# Patient Record
Sex: FEMALE | Race: BLACK OR AFRICAN AMERICAN | NOT HISPANIC OR LATINO | Employment: UNEMPLOYED | URBAN - METROPOLITAN AREA
[De-identification: names, ages, dates, MRNs, and addresses within clinical notes are randomized per-mention and may not be internally consistent; named-entity substitution may affect disease eponyms.]

---

## 2024-03-09 ENCOUNTER — APPOINTMENT (OUTPATIENT)
Dept: ULTRASOUND IMAGING | Facility: CLINIC | Age: 39
End: 2024-03-09
Payer: COMMERCIAL

## 2024-03-09 ENCOUNTER — HOSPITAL ENCOUNTER (EMERGENCY)
Facility: CLINIC | Age: 39
Discharge: HOME | End: 2024-03-09
Attending: EMERGENCY MEDICINE | Admitting: EMERGENCY MEDICINE
Payer: COMMERCIAL

## 2024-03-09 VITALS
DIASTOLIC BLOOD PRESSURE: 60 MMHG | WEIGHT: 200 LBS | OXYGEN SATURATION: 100 % | TEMPERATURE: 98.3 F | HEIGHT: 69 IN | SYSTOLIC BLOOD PRESSURE: 102 MMHG | HEART RATE: 72 BPM | BODY MASS INDEX: 29.62 KG/M2 | RESPIRATION RATE: 20 BRPM

## 2024-03-09 DIAGNOSIS — R10.9 ABDOMINAL DISCOMFORT: ICD-10-CM

## 2024-03-09 DIAGNOSIS — B96.89 BACTERIAL VAGINOSIS: Primary | ICD-10-CM

## 2024-03-09 DIAGNOSIS — Z34.91 FIRST TRIMESTER PREGNANCY: ICD-10-CM

## 2024-03-09 DIAGNOSIS — N76.0 BACTERIAL VAGINOSIS: Primary | ICD-10-CM

## 2024-03-09 LAB
ALBUMIN SERPL BCG-MCNC: 3.8 G/DL (ref 3.5–5.2)
ALBUMIN UR-MCNC: 20 MG/DL
ALP SERPL-CCNC: 59 U/L (ref 40–150)
ALT SERPL W P-5'-P-CCNC: <5 U/L (ref 0–50)
ANION GAP SERPL CALCULATED.3IONS-SCNC: 11 MMOL/L (ref 7–15)
APPEARANCE UR: ABNORMAL
AST SERPL W P-5'-P-CCNC: 12 U/L (ref 0–45)
BACTERIA #/AREA URNS HPF: ABNORMAL /HPF
BASOPHILS # BLD AUTO: 0 10E3/UL (ref 0–0.2)
BASOPHILS NFR BLD AUTO: 0 %
BILIRUB DIRECT SERPL-MCNC: <0.2 MG/DL (ref 0–0.3)
BILIRUB SERPL-MCNC: 0.3 MG/DL
BILIRUB UR QL STRIP: NEGATIVE
BUN SERPL-MCNC: 6.5 MG/DL (ref 6–20)
CALCIUM SERPL-MCNC: 8.6 MG/DL (ref 8.6–10)
CHLORIDE SERPL-SCNC: 104 MMOL/L (ref 98–107)
CLUE CELLS: PRESENT
COLOR UR AUTO: YELLOW
CREAT SERPL-MCNC: 0.65 MG/DL (ref 0.51–0.95)
DEPRECATED HCO3 PLAS-SCNC: 22 MMOL/L (ref 22–29)
EGFRCR SERPLBLD CKD-EPI 2021: >90 ML/MIN/1.73M2
EOSINOPHIL # BLD AUTO: 0.1 10E3/UL (ref 0–0.7)
EOSINOPHIL NFR BLD AUTO: 2 %
ERYTHROCYTE [DISTWIDTH] IN BLOOD BY AUTOMATED COUNT: 12.8 % (ref 10–15)
GLUCOSE SERPL-MCNC: 102 MG/DL (ref 70–99)
GLUCOSE UR STRIP-MCNC: NEGATIVE MG/DL
HCG INTACT+B SERPL-ACNC: ABNORMAL MIU/ML
HCT VFR BLD AUTO: 35.6 % (ref 35–47)
HGB BLD-MCNC: 12.5 G/DL (ref 11.7–15.7)
HGB UR QL STRIP: ABNORMAL
IMM GRANULOCYTES # BLD: 0 10E3/UL
IMM GRANULOCYTES NFR BLD: 0 %
KETONES UR STRIP-MCNC: ABNORMAL MG/DL
LEUKOCYTE ESTERASE UR QL STRIP: ABNORMAL
LIPASE SERPL-CCNC: 33 U/L (ref 13–60)
LYMPHOCYTES # BLD AUTO: 1.1 10E3/UL (ref 0–5.3)
LYMPHOCYTES NFR BLD AUTO: 25 %
MAGNESIUM SERPL-MCNC: 2 MG/DL (ref 1.7–2.3)
MCH RBC QN AUTO: 27.7 PG (ref 26.5–33)
MCHC RBC AUTO-ENTMCNC: 35.1 G/DL (ref 31.5–36.5)
MCV RBC AUTO: 79 FL (ref 78–100)
MONOCYTES # BLD AUTO: 0.2 10E3/UL (ref 0–1.3)
MONOCYTES NFR BLD AUTO: 5 %
MUCOUS THREADS #/AREA URNS LPF: PRESENT /LPF
NEUTROPHILS # BLD AUTO: 3 10E3/UL (ref 1.6–8.3)
NEUTROPHILS NFR BLD AUTO: 67 %
NITRATE UR QL: NEGATIVE
NRBC # BLD AUTO: 0 10E3/UL
NRBC BLD AUTO-RTO: 0 /100
PH UR STRIP: 6 [PH] (ref 5–7)
PLATELET # BLD AUTO: 226 10E3/UL (ref 150–450)
POTASSIUM SERPL-SCNC: 3.7 MMOL/L (ref 3.4–5.3)
PROT SERPL-MCNC: 6.8 G/DL (ref 6.4–8.3)
RBC # BLD AUTO: 4.52 10E6/UL (ref 3.8–5.2)
RBC URINE: 13 /HPF
SODIUM SERPL-SCNC: 137 MMOL/L (ref 135–145)
SP GR UR STRIP: 1.02 (ref 1–1.03)
SQUAMOUS EPITHELIAL: 46 /HPF
TRICHOMONAS, WET PREP: ABNORMAL
UROBILINOGEN UR STRIP-MCNC: 2 MG/DL
WBC # BLD AUTO: 4.5 10E3/UL (ref 4–11)
WBC URINE: 36 /HPF
WBC'S/HIGH POWER FIELD, WET PREP: ABNORMAL
YEAST, WET PREP: ABNORMAL

## 2024-03-09 PROCEDURE — 83690 ASSAY OF LIPASE: CPT

## 2024-03-09 PROCEDURE — 85025 COMPLETE CBC W/AUTO DIFF WBC: CPT

## 2024-03-09 PROCEDURE — 84702 CHORIONIC GONADOTROPIN TEST: CPT

## 2024-03-09 PROCEDURE — 99285 EMERGENCY DEPT VISIT HI MDM: CPT | Mod: 25

## 2024-03-09 PROCEDURE — 83735 ASSAY OF MAGNESIUM: CPT

## 2024-03-09 PROCEDURE — 76801 OB US < 14 WKS SINGLE FETUS: CPT

## 2024-03-09 PROCEDURE — 87086 URINE CULTURE/COLONY COUNT: CPT

## 2024-03-09 PROCEDURE — 36415 COLL VENOUS BLD VENIPUNCTURE: CPT

## 2024-03-09 PROCEDURE — 80053 COMPREHEN METABOLIC PANEL: CPT

## 2024-03-09 PROCEDURE — 81001 URINALYSIS AUTO W/SCOPE: CPT

## 2024-03-09 PROCEDURE — 87210 SMEAR WET MOUNT SALINE/INK: CPT

## 2024-03-09 RX ORDER — METRONIDAZOLE 500 MG/1
500 TABLET ORAL 2 TIMES DAILY
Qty: 14 TABLET | Refills: 0 | Status: SHIPPED | OUTPATIENT
Start: 2024-03-09 | End: 2024-03-16

## 2024-03-09 ASSESSMENT — COLUMBIA-SUICIDE SEVERITY RATING SCALE - C-SSRS
1. IN THE PAST MONTH, HAVE YOU WISHED YOU WERE DEAD OR WISHED YOU COULD GO TO SLEEP AND NOT WAKE UP?: NO
6. HAVE YOU EVER DONE ANYTHING, STARTED TO DO ANYTHING, OR PREPARED TO DO ANYTHING TO END YOUR LIFE?: NO
2. HAVE YOU ACTUALLY HAD ANY THOUGHTS OF KILLING YOURSELF IN THE PAST MONTH?: NO

## 2024-03-09 ASSESSMENT — ACTIVITIES OF DAILY LIVING (ADL)
ADLS_ACUITY_SCORE: 35

## 2024-03-09 NOTE — ED PROVIDER NOTES
Emergency Department Midlevel Supervisory Note     I personally saw the patient and performed a substantive portion of the visit including all aspects of the medical decision making.    Impression:  1. Bacterial vaginosis    2. Abdominal discomfort    3. First trimester pregnancy            MDM / ED Course:  38-year-old female who is approximate 10 weeks pregnant presented to the ED for evaluation of diffuse abdominal pain.  She denied any associated vaginal bleeding or discharge.  Upon arrival to the ED the patient was hemodynamically stable.  At the time of my evaluation the patient stated that she had a bowel movement while here in the ED and that her abdominal pain had essentially resolved.  On my exam the patient had mild periumbilical abdominal tenderness palpation.  She did not have evidence of an acute abdomen, however.    CBC, CMP, lipase, and magnesium were all reassuring.  The patient's hCG was slightly over 52,000.  The urinalysis was contaminated and therefore was difficult to determine if she had a urinary tract infection.  Wet prep was positive for clue cells.    Pelvic ultrasound shows a normal intrauterine pregnancy at 12 weeks 3 days.    The patient was informed of the lab and imaging results.  The patient will be treated for bacterial vaginosis.  The patient will await the urine culture results before starting antibiotics since she did not believe that her symptoms were related to a urinary tract infection.  The patient was instructed to use MiraLAX as needed for any further episodes of constipation.  She was instructed to follow-up with her OB provider or to return back to ED sooner for any worsening abdominal pain, vaginal bleeding or discharge, or any other new or concerning symptoms.    11:53 AM Crissy Ta PA-C staffed patient with me. I agree with their assessment and plan of management, and I will see the patient.    14:10  I met with the patient to introduce myself, gather  additional history, perform my initial exam, and discuss the plan.   PPE: Provider wore gloves, N95 mask, eye protection.    Brief HPI:     Dash Victor is a 38 year old female who presents for evaluation of abdominal pain. At 0900, the patient had sudden onset of constant abdominal cramping and she is 10 weeks pregnant.         I, Corby Najera, am serving as a scribe to document services personally performed by Dr. Janneth Forte, based on my observations and the provider's statements to me.   I, Dr. Janneth Forte, attest that Corby Najera was acting in a scribe capacity, has observed my performance of the services and has documented them in accordance with my direction.      Brief Physical Exam:  General presentation: Alert, Vital signs reviewed. No apparent distress.   HENT: ENT inspection is normal. Oropharynx is moist and clear.   Eye: Pupils are equal and reactive to light. EOMI  Neck: The neck is supple.  Pulmonary: Currently in no acute respiratory distress.   Abd: Normal bowel sounds.  Minimal tenderness palpation noted periumbilical region.  No rigidity, guarding, or rebound.  Circulatory: Peripheral pulses are strong and equal in the bilateral upper lower extremities.   Neurologic: Alert, oriented to person, place, and time. No gross motor or sensory deficits noted in the upper or lower extremities. Cranial nerves II through XII are grossly intact.  Musculoskeletal: No extremity tenderness. Full range of motion in all extremities. No extremity edema.   Skin: Skin color is normal. No rash. Warm. Dry to touch.       Labs and Imaging:  Results for orders placed or performed during the hospital encounter of 03/09/24   US OB <14 Weeks W Transvaginal    Impression    IMPRESSION:   1.  Single living intrauterine gestation at 12 weeks 3 days, EDC 09/18/2024.  2.  No definite visualized explanation for patient's symptoms.       Basic metabolic panel   Result Value Ref Range    Sodium 137 135 - 145 mmol/L    Potassium 3.7  3.4 - 5.3 mmol/L    Chloride 104 98 - 107 mmol/L    Carbon Dioxide (CO2) 22 22 - 29 mmol/L    Anion Gap 11 7 - 15 mmol/L    Urea Nitrogen 6.5 6.0 - 20.0 mg/dL    Creatinine 0.65 0.51 - 0.95 mg/dL    GFR Estimate >90 >60 mL/min/1.73m2    Calcium 8.6 8.6 - 10.0 mg/dL    Glucose 102 (H) 70 - 99 mg/dL   Hepatic function panel   Result Value Ref Range    Protein Total 6.8 6.4 - 8.3 g/dL    Albumin 3.8 3.5 - 5.2 g/dL    Bilirubin Total 0.3 <=1.2 mg/dL    Alkaline Phosphatase 59 40 - 150 U/L    AST 12 0 - 45 U/L    ALT <5 0 - 50 U/L    Bilirubin Direct <0.20 0.00 - 0.30 mg/dL   Result Value Ref Range    Lipase 33 13 - 60 U/L   Result Value Ref Range    Magnesium 2.0 1.7 - 2.3 mg/dL   UA with Microscopic reflex to Culture    Specimen: Urine, Midstream   Result Value Ref Range    Color Urine Yellow Colorless, Straw, Light Yellow, Yellow    Appearance Urine Turbid (A) Clear    Glucose Urine Negative Negative mg/dL    Bilirubin Urine Negative Negative    Ketones Urine Trace (A) Negative mg/dL    Specific Gravity Urine 1.024 1.001 - 1.030    Blood Urine 1.0 mg/dL (A) Negative    pH Urine 6.0 5.0 - 7.0    Protein Albumin Urine 20 (A) Negative mg/dL    Urobilinogen Urine 2.0 (A) <2.0 mg/dL    Nitrite Urine Negative Negative    Leukocyte Esterase Urine 500 Herman/uL (A) Negative    Bacteria Urine Few (A) None Seen /HPF    Mucus Urine Present (A) None Seen /LPF    RBC Urine 13 (H) <=2 /HPF    WBC Urine 36 (H) <=5 /HPF    Squamous Epithelials Urine 46 (H) <=1 /HPF   CBC with platelets and differential   Result Value Ref Range    WBC Count 4.5 4.0 - 11.0 10e3/uL    RBC Count 4.52 3.80 - 5.20 10e6/uL    Hemoglobin 12.5 11.7 - 15.7 g/dL    Hematocrit 35.6 35.0 - 47.0 %    MCV 79 78 - 100 fL    MCH 27.7 26.5 - 33.0 pg    MCHC 35.1 31.5 - 36.5 g/dL    RDW 12.8 10.0 - 15.0 %    Platelet Count 226 150 - 450 10e3/uL    % Neutrophils 67 %    % Lymphocytes 25 %    % Monocytes 5 %    % Eosinophils 2 %    % Basophils 0 %    % Immature Granulocytes  0 %    NRBCs per 100 WBC 0 <1 /100    Absolute Neutrophils 3.0 1.6 - 8.3 10e3/uL    Absolute Lymphocytes 1.1 0.0 - 5.3 10e3/uL    Absolute Monocytes 0.2 0.0 - 1.3 10e3/uL    Absolute Eosinophils 0.1 0.0 - 0.7 10e3/uL    Absolute Basophils 0.0 0.0 - 0.2 10e3/uL    Absolute Immature Granulocytes 0.0 <=0.4 10e3/uL    Absolute NRBCs 0.0 10e3/uL   HCG quantitative pregnancy (blood)   Result Value Ref Range    hCG Quantitative 52,578 (H) <5 mIU/mL   Wet prep    Specimen: Vagina; Swab   Result Value Ref Range    Trichomonas Absent Absent    Yeast Absent Absent    Clue Cells Present (A) Absent    WBCs/high power field 1+ (A) None     I have reviewed the relevant laboratory and radiology studies        Dr. Janneth Forte  [facility] EMERGENCY ROOM  [facility] [facility] [address] [phone]     Janneth Forte,   03/09/24 1608

## 2024-03-09 NOTE — DISCHARGE INSTRUCTIONS
You were seen in the emergency department today for your symptoms.      Your lab studies showed bacterial vaginosis.  Antibiotics were prescribed for bacterial vaginosis. Please take the antibiotics as prescribed.  Please follow-up with your OB/GYN as soon as possible for close recheck.      Your urine culture is not back yet.  Someone will call you in the next 3-4 days if the urine culture shows a urinary tract infection.    Please take 1 capful MiraLAX dissolved in 8 ounces of water, juice, soda, or tea every day for the next 3-5 days to help keep your bowel movements regular.      Return to the emergency department if you develop any new, worsening, concerning symptoms.

## 2024-03-09 NOTE — ED PROVIDER NOTES
EMERGENCY DEPARTMENT ENCOUNTER      NAME: Dash Victor  AGE: 38 year old female  YOB: 1985  MRN: [id number]  EVALUATION DATE & TIME: 24 10:25 AM    PCP: No primary care provider on file.    ED PROVIDER: Crissy Ta PA-C      CHIEF COMPLAINT:  Abdominal Pain      FINAL IMPRESSION:  1. Bacterial vaginosis    2. Abdominal discomfort    3. First trimester pregnancy          ED COURSE & MEDICAL DECISION MAKING:  Pertinent Labs & Imaging studies reviewed. (See chart for details)    The patient is a 38-year-old female  11w2d weeks pregnant per LMP 23 presenting to emergency department for evaluation of abdominal pain onset this morning at 9 AM.  Patient states that abdominal pain is diffuse, intermittent with associated urinary and bowel urgency.  No history of similar symptoms.    Initial vital signs reviewed and all within normal limits.  Patient afebrile.  On exam, she is clinically well-appearing and is nontoxic-appearing resting comfortably in hospital bed in no acute distress.  Abdomen soft, nondistended with no reproducible tenderness for me on exam.  Bowel sounds are present.  No CVA tenderness.    Differential diagnosis includes ectopic pregnancy, ovarian torsion, ovarian cyst, gastroenteritis, constipation, cystitis, vaginitis, dyspepsia, TOA, atypical pancreatitis, hepatobiliary disease.  Low clinical suspicion for appendicitis, cholecystitis, bowel obstruction, perforated viscus, ruptured AAA, mesenteric ischemia, diverticulitis, pyelonephritis, hernia, PID. I have lower suspicion for symptoms secondary to cardiopulmonary or vascular process given history and exam.     CBC reassuring with no leukocytosis.  Hemoglobin 12.5.  BMP without significant electrolyte abnormality.  Magnesium within normal limits.  Lipase within normal limits.  Hepatic function panel within normal limits.  hCG 52,578.  Wet prep with clue cells present, most consistent with bacterial vaginitis.   Urinalysis appears contaminated, ordered repeat urinalysis patient declined providing repeat urine sample.  Ultrasound obtained which revealed single living intrauterine gestation at 12 weeks 3 days, no definite visualized explanation for patient's symptoms.    Patient reported that her abdominal pain is significantly improved after she was able to have a bowel movement, I suspect that her abdominal discomfort with reassuring laboratory studies here is most likely secondary to constipation.  Ordered repeat urinalysis as patient did have abnormal urinalysis although it was contaminated.  Patient prefers to await urine culture prior to treatment.    Overall, patient history, exam, workup here most consistent with bacterial vaginosis, confirmed first trimester intrauterine pregnancy, and abdominal discomfort.    Discussed plan for discharge home with course of metronidazole for bacterial vaginosis, MiraLAX for possible constipation contributing to patient's symptoms, and close outpatient follow-up with her OB/GYN.  Urine culture pending, did discuss with patient that someone should be in contact with her within the next few days if her results are positive.  The patient verbalized understanding and is comfortable with this plan. Symptomatic home cares discussed. Emphasized importance of follow up with primary care clinician. Strict return precautions discussed.     At the conclusion of the encounter I discussed the results of all of the tests and the disposition. The questions were answered. The patient or family acknowledged understanding and was agreeable with the care plan.       ED COURSE:  11:18 AM I met and introduced myself to the patient. I gathered initial history and performed an initial physical exam. We discussed options and plan for diagnostics and treatment here in the ED.  11:53 AM I have staffed the patient with Dr. Janneth Forte, ED physician, who has evaluated the patient and agrees with all aspects of  today's care.  2:45 PM I discussed constipation medications safe in pregnancy with ED pharmacist, MiraLAX is drug of choice for constipation and pregnant women  2:58 PM I discussed the plan for discharge with the patient, and patient is agreeable. We discussed supportive cares at home and reasons for return to the ER including new or worsening symptoms - all questions and concerns addressed to the best of my ability. Strict return precautions discussed. Patient to be discharged by RN.      Medical Decision Making  Obtained supplemental history:Supplemental history obtained?: No  Reviewed external records: External records reviewed?: No  Care impacted by chronic illness:N/A  Care significantly affected by social determinants of health:N/A  Did you consider but not order tests?: Work up considered but not performed and documented in chart, if applicable  Did you interpret images independently?: Independent interpretation of ECG and images noted in documentation, when applicable.  Consultation discussion with other provider:Did you involve another provider (consultant, MH, pharmacy, etc.)?: I discussed the care with another health care provider, see documentation for details.  Discharge. I prescribed additional prescription strength medication(s) as charted. See documentation for any additional details.      CRITICAL CARE:  None      MEDICATIONS GIVEN IN THE EMERGENCY:  Medications - No data to display    NEW PRESCRIPTIONS STARTED AT TODAY'S ER VISIT  Discharge Medication List as of 3/9/2024  3:07 PM        START taking these medications    Details   metroNIDAZOLE (FLAGYL) 500 MG tablet Take 1 tablet (500 mg) by mouth 2 times daily for 7 days, Disp-14 tablet, R-0, Local PrintEat yogurt or cottage cheese daily to prevent diarrhea that can be caused by taking this medication.                =================================================================    HPI    Patient information was obtained from: The  "patient    Use of Intrepreter: N/A         Oksungtracie Victor is a 38 year old female  with pertinent medical history of  section 17 months ago who presents to the emergency department for evaluation of abdominal pain.    The patient reports diffuse intermittent abdominal pain with urgency to have a bowel movement and urgency to void onset at 9 AM this morning.  No fevers, nausea, emesis, melena, hematochezia, vaginal bleeding, abnormal vaginal discharge, cough.  No known sick contacts.  No one at home with similar symptoms.  No recent travel or antibiotic use.  No history of similar symptoms.  No marijuana use.  Patient does not take any medication on a regular basis.  She is otherwise healthy.  She is currently 11 weeks 2 days pregnant per LMP of 2023.  She had her first appointment with her OB/GYN at [facility] yesterday, has not had an ultrasound thus far.  She had a  section 17 months ago, no other prior abdominal surgeries.      PAST MEDICAL HISTORY:  No past medical history on file.    PAST SURGICAL HISTORY:  No past surgical history on file.    CURRENT MEDICATIONS:    None       ALLERGIES:  No Known Allergies    FAMILY HISTORY:  No family history on file.    SOCIAL HISTORY:        VITALS:    First Vitals:  Patient Vitals for the past 24 hrs:   BP Temp Temp src Pulse Resp SpO2 Height Weight   24 1459 102/60 -- -- 72 -- 100 % -- --   24 1023 120/69 98.3  F (36.8  C) Oral 66 20 100 % 1.753 m (5' 9\") 90.7 kg (200 lb)       Patient Vitals for the past 24 hrs:   BP Temp Temp src Pulse Resp SpO2 Height Weight   24 1459 102/60 -- -- 72 -- 100 % -- --   24 1023 120/69 98.3  F (36.8  C) Oral 66 20 100 % 1.753 m (5' 9\") 90.7 kg (200 lb)         PHYSICAL EXAM  VITAL SIGNS: /60   Pulse 72   Temp 98.3  F (36.8  C) (Oral)   Resp 20   Ht 1.753 m (5' 9\")   Wt 90.7 kg (200 lb)   LMP 2023   SpO2 100%   BMI 29.53 kg/m     GENERAL: Awake, alert, answering " questions appropriately, resting comfortably in hospital bed in no acute distress.  HEENT: Conjunctiva clear, no drainage.     SPEECH:  Easy to understand speech, Normal volume and dayna. Normal phonation.  PULMONARY: No respiratory distress, Breathing comfortably on room air. Lungs clear to auscultation bilaterally.  CARDIOVASCULAR: Regular rate and rhythm, radial pulses present, symmetric, and normal.  ABDOMINAL: Soft, Nondistended, Nontender, No rebound or guarding.  Bowel sounds present.  BACK: No CVA tenderness  EXTREMITIES: Extremities are warm and well perfused.  NEUROLOGIC: Moving all extremities spontaneously.   SKIN: Exposed areas of skin warm, dry, no rashes.  PSYCH: Normal mood and affect.         RADIOLOGY/LAB:  Reviewed all pertinent imaging. Please see official radiology report.  All pertinent labs reviewed and interpreted.  Results for orders placed or performed during the hospital encounter of 03/09/24   US OB <14 Weeks W Transvaginal    Impression    IMPRESSION:   1.  Single living intrauterine gestation at 12 weeks 3 days, EDC 09/18/2024.  2.  No definite visualized explanation for patient's symptoms.       Basic metabolic panel   Result Value Ref Range    Sodium 137 135 - 145 mmol/L    Potassium 3.7 3.4 - 5.3 mmol/L    Chloride 104 98 - 107 mmol/L    Carbon Dioxide (CO2) 22 22 - 29 mmol/L    Anion Gap 11 7 - 15 mmol/L    Urea Nitrogen 6.5 6.0 - 20.0 mg/dL    Creatinine 0.65 0.51 - 0.95 mg/dL    GFR Estimate >90 >60 mL/min/1.73m2    Calcium 8.6 8.6 - 10.0 mg/dL    Glucose 102 (H) 70 - 99 mg/dL   Hepatic function panel   Result Value Ref Range    Protein Total 6.8 6.4 - 8.3 g/dL    Albumin 3.8 3.5 - 5.2 g/dL    Bilirubin Total 0.3 <=1.2 mg/dL    Alkaline Phosphatase 59 40 - 150 U/L    AST 12 0 - 45 U/L    ALT <5 0 - 50 U/L    Bilirubin Direct <0.20 0.00 - 0.30 mg/dL   Result Value Ref Range    Lipase 33 13 - 60 U/L   Result Value Ref Range    Magnesium 2.0 1.7 - 2.3 mg/dL   UA with Microscopic  reflex to Culture    Specimen: Urine, Midstream   Result Value Ref Range    Color Urine Yellow Colorless, Straw, Light Yellow, Yellow    Appearance Urine Turbid (A) Clear    Glucose Urine Negative Negative mg/dL    Bilirubin Urine Negative Negative    Ketones Urine Trace (A) Negative mg/dL    Specific Gravity Urine 1.024 1.001 - 1.030    Blood Urine 1.0 mg/dL (A) Negative    pH Urine 6.0 5.0 - 7.0    Protein Albumin Urine 20 (A) Negative mg/dL    Urobilinogen Urine 2.0 (A) <2.0 mg/dL    Nitrite Urine Negative Negative    Leukocyte Esterase Urine 500 Herman/uL (A) Negative    Bacteria Urine Few (A) None Seen /HPF    Mucus Urine Present (A) None Seen /LPF    RBC Urine 13 (H) <=2 /HPF    WBC Urine 36 (H) <=5 /HPF    Squamous Epithelials Urine 46 (H) <=1 /HPF   CBC with platelets and differential   Result Value Ref Range    WBC Count 4.5 4.0 - 11.0 10e3/uL    RBC Count 4.52 3.80 - 5.20 10e6/uL    Hemoglobin 12.5 11.7 - 15.7 g/dL    Hematocrit 35.6 35.0 - 47.0 %    MCV 79 78 - 100 fL    MCH 27.7 26.5 - 33.0 pg    MCHC 35.1 31.5 - 36.5 g/dL    RDW 12.8 10.0 - 15.0 %    Platelet Count 226 150 - 450 10e3/uL    % Neutrophils 67 %    % Lymphocytes 25 %    % Monocytes 5 %    % Eosinophils 2 %    % Basophils 0 %    % Immature Granulocytes 0 %    NRBCs per 100 WBC 0 <1 /100    Absolute Neutrophils 3.0 1.6 - 8.3 10e3/uL    Absolute Lymphocytes 1.1 0.0 - 5.3 10e3/uL    Absolute Monocytes 0.2 0.0 - 1.3 10e3/uL    Absolute Eosinophils 0.1 0.0 - 0.7 10e3/uL    Absolute Basophils 0.0 0.0 - 0.2 10e3/uL    Absolute Immature Granulocytes 0.0 <=0.4 10e3/uL    Absolute NRBCs 0.0 10e3/uL   HCG quantitative pregnancy (blood)   Result Value Ref Range    hCG Quantitative 52,578 (H) <5 mIU/mL   Wet prep    Specimen: Vagina; Swab   Result Value Ref Range    Trichomonas Absent Absent    Yeast Absent Absent    Clue Cells Present (A) Absent    WBCs/high power field 1+ (A) None               Crissy Ta PA-C  Emergency Medicine  [facility]  [facility] EMERGENCY ROOM  [address] [facility] [address]  [phone]  Dept: [phone]     Crissy Ta PA-C  03/09/24 1518

## 2024-03-09 NOTE — ED TRIAGE NOTES
Presents to the ED via EMS for abdominal pain. States pain started about an hour ago. Describes as cramping and constant. Pt also states she is 10 weeks pregnant. Denies any vaginal bleeding or discharge.     Triage Assessment (Adult)       Row Name 03/09/24 1025          Triage Assessment    Airway WDL WDL        Respiratory WDL    Respiratory WDL WDL        Cardiac WDL    Cardiac WDL WDL        Peripheral/Neurovascular WDL    Peripheral Neurovascular WDL WDL        Cognitive/Neuro/Behavioral WDL    Cognitive/Neuro/Behavioral WDL WDL

## 2024-03-10 LAB — BACTERIA UR CULT: NORMAL

## 2024-04-30 ENCOUNTER — HOSPITAL ENCOUNTER (EMERGENCY)
Facility: CLINIC | Age: 39
Discharge: HOME | End: 2024-04-30
Attending: EMERGENCY MEDICINE | Admitting: EMERGENCY MEDICINE
Payer: COMMERCIAL

## 2024-04-30 VITALS
TEMPERATURE: 99.8 F | HEIGHT: 69 IN | OXYGEN SATURATION: 98 % | WEIGHT: 200 LBS | SYSTOLIC BLOOD PRESSURE: 115 MMHG | HEART RATE: 111 BPM | DIASTOLIC BLOOD PRESSURE: 64 MMHG | BODY MASS INDEX: 29.62 KG/M2 | RESPIRATION RATE: 20 BRPM

## 2024-04-30 DIAGNOSIS — J06.9 VIRAL UPPER RESPIRATORY ILLNESS: ICD-10-CM

## 2024-04-30 LAB
ALBUMIN UR-MCNC: NEGATIVE MG/DL
APPEARANCE UR: CLEAR
BILIRUB UR QL STRIP: NEGATIVE
COLOR UR AUTO: COLORLESS
FLUAV RNA SPEC QL NAA+PROBE: NEGATIVE
FLUBV RNA RESP QL NAA+PROBE: NEGATIVE
GLUCOSE UR STRIP-MCNC: NEGATIVE MG/DL
HGB UR QL STRIP: ABNORMAL
KETONES UR STRIP-MCNC: 20 MG/DL
LEUKOCYTE ESTERASE UR QL STRIP: NEGATIVE
MUCOUS THREADS #/AREA URNS LPF: PRESENT /LPF
NITRATE UR QL: NEGATIVE
PH UR STRIP: 6.5 [PH] (ref 5–7)
RBC URINE: <1 /HPF
RSV RNA SPEC NAA+PROBE: NEGATIVE
SARS-COV-2 RNA RESP QL NAA+PROBE: NEGATIVE
SP GR UR STRIP: 1.01 (ref 1–1.03)
SQUAMOUS EPITHELIAL: <1 /HPF
UROBILINOGEN UR STRIP-MCNC: <2 MG/DL
WBC URINE: <1 /HPF

## 2024-04-30 PROCEDURE — 99283 EMERGENCY DEPT VISIT LOW MDM: CPT

## 2024-04-30 PROCEDURE — 250N000013 HC RX MED GY IP 250 OP 250 PS 637: Performed by: EMERGENCY MEDICINE

## 2024-04-30 PROCEDURE — 87637 SARSCOV2&INF A&B&RSV AMP PRB: CPT | Performed by: EMERGENCY MEDICINE

## 2024-04-30 PROCEDURE — 81001 URINALYSIS AUTO W/SCOPE: CPT | Performed by: EMERGENCY MEDICINE

## 2024-04-30 RX ORDER — ACETAMINOPHEN 325 MG/1
975 TABLET ORAL ONCE
Status: COMPLETED | OUTPATIENT
Start: 2024-04-30 | End: 2024-04-30

## 2024-04-30 RX ADMIN — ACETAMINOPHEN 975 MG: 325 TABLET ORAL at 01:48

## 2024-04-30 ASSESSMENT — ACTIVITIES OF DAILY LIVING (ADL)
ADLS_ACUITY_SCORE: 35
ADLS_ACUITY_SCORE: 35

## 2024-04-30 ASSESSMENT — COLUMBIA-SUICIDE SEVERITY RATING SCALE - C-SSRS
1. IN THE PAST MONTH, HAVE YOU WISHED YOU WERE DEAD OR WISHED YOU COULD GO TO SLEEP AND NOT WAKE UP?: NO
2. HAVE YOU ACTUALLY HAD ANY THOUGHTS OF KILLING YOURSELF IN THE PAST MONTH?: NO
6. HAVE YOU EVER DONE ANYTHING, STARTED TO DO ANYTHING, OR PREPARED TO DO ANYTHING TO END YOUR LIFE?: NO

## 2024-04-30 NOTE — ED NOTES
Bed: WWED-08  Expected date: 4/30/24  Expected time: 1:20 AM  Means of arrival: Ambulance  Comments:  [facility]  39 y/o F  Fever/ 20 weeks pregnant  Bp: 121/palp  HR: 107  98% RA

## 2024-04-30 NOTE — ED PROVIDER NOTES
EMERGENCY DEPARTMENT ENCOUNTER      NAME: Dash Victor  AGE: 38 year old female  YOB: 1985  MRN: [id number]  EVALUATION DATE & TIME: 4/30/2024  1:35 AM    PCP: Clinic, [facility]    ED PROVIDER: Deirdre Gary M.D.      Chief Complaint   Patient presents with    Generalized Body Aches    Influenza         FINAL IMPRESSION:  1. Viral upper respiratory illness          ED COURSE & MEDICAL DECISION MAKING:    ED Course as of 04/30/24 0238   Tue Apr 30, 2024   0141 Very anxious patient BIB EMS at 20 weeks gestation with one day feeling full body aches and fatigue and coughing with dry cough, child at home sick with some, home COVID19 test negative. Examination with no anomalies to auscultation and sat WNL reassuringly, pending XR and viral PCR. No abdominal pain at all thus no signs of GI pathology reassuringly and no NVD, but pending UA. No contractions/discharge or vaginal bleeding reassuringly, and no meningismus/AMS, rash or fever. With sick contact at home and symptoms primarily body aches and cough, likely viral URTI but will ensure no PNA with XR and screening UA underway as pregnant patients more likely to have UTI, tylenol administered for comfort, patient feeling improved   0203 UA without cells to suggest UTI reassuringly and negative for nitrite and LE   0223 Pt declined CXR offered. Aware of less likely possibility of PNA but very unlikely with sat 99% RA and afebrile here with normal RR   0236 Viral PCR negative at this time, may still be influenza or COVID19 but with only one day symptoms, likely early in illness. No GI symptoms detected on ROS or exam, no auscultation abnormalities or low sat or tachypnea to suggest PNA, no AMS/neck symptoms or rash to suggest central infection, no focal pains and VS normalized after anxiety of arrival, patient with anxiety iwith one day of body aches with sick contacts and dry cough consistent with viral upper respiratory tract infection.  "Patient discharged after being provided with extensive anticipatory guidance and given return precautions, importance of PMD follow-up emphasized.        Pertinent Labs & Imaging studies reviewed. (See chart for details)      At the conclusion of the encounter I discussed the results of all of the tests and the disposition. The questions were answered. The patient or family acknowledged understanding and was agreeable with the care plan.     MEDICATIONS GIVEN IN THE EMERGENCY:  Medications   acetaminophen (TYLENOL) tablet 975 mg (975 mg Oral $Given 4/30/24 0205)       NEW PRESCRIPTIONS STARTED AT TODAY'S ER VISIT  New Prescriptions    No medications on file          =================================================================    HPI      Dash Victor is a 38 year old female with PMHx of 20 weeks pregnant, no other contributory medical history who presents to the ED today via EMS with flu symptoms     EMS reports the patient complains of one day of cough, body aches, and subjective fever.  They report that the patient's 18-month-old is sick with similar symptoms as well.  Patient's oxygen saturations were 99% on room air.  The patient did not receive any medications en route.  The patient is currently 20 weeks pregnant.    Patient reports fatigue, headache, cough, chest pain with coughing, back pain, and bodyaches starting yesterday.  The patient reports she took a COVID test at home that was negative.  The patient took Tylenol without relief.  The patient is continuing to repeat, \"my body, I'm not feeling it\" and \"I feel inside my body\".    The patient reports she received her vaccines this year, but does not remember which vaccines.  The patient denies a productive cough, congestion, abdominal pain, vaginal bleeding, vaginal discharge, dysuria, hematuria, rash, or any other symptoms or complaints.    The patient reports she called EMS because she could not drive herself and her partner is at work.  The " "patient reports her older sister is at home watching her 18-month-old.    REVIEW OF SYSTEMS   All other systems reviewed and are negative except as noted above in HPI.    PAST MEDICAL HISTORY:  History reviewed. No pertinent past medical history.    PAST SURGICAL HISTORY:  History reviewed. No pertinent surgical history.    CURRENT MEDICATIONS:    No current outpatient medications on file.      ALLERGIES:  No Known Allergies    FAMILY HISTORY:  History reviewed. No pertinent family history.    SOCIAL HISTORY:   Social History     Socioeconomic History    Marital status:        VITALS:  Patient Vitals for the past 24 hrs:   BP Temp Temp src Pulse Resp SpO2 Height Weight   04/30/24 0200 112/65 -- -- 95 -- 97 % -- --   04/30/24 0145 113/56 -- -- 107 -- 98 % -- --   04/30/24 0142 -- -- -- -- -- -- 1.753 m (5' 9\") 90.7 kg (200 lb)   04/30/24 0139 113/56 99.8  F (37.7  C) Oral 101 20 99 % -- --       PHYSICAL EXAM    GENERAL: Awake, alert.  In no acute distress.   HEENT: Normocephalic, atraumatic.  Pupils equal, round and reactive.  Conjunctiva normal.  EOMI.  NECK: No stridor or apparent deformity.  PULMONARY: Symmetrical breath sounds without distress.  Lungs clear to auscultation bilaterally without wheezes, rhonchi or rales.  CARDIO: Regular rate and rhythm.  No significant murmur, rub or gallop.  Radial pulses strong and symmetrical.  ABDOMINAL: Abdomen soft, non-distended and non-tender to palpation.  No CVAT, no palpable hepatosplenomegaly. Uterus slightly above umbilicus   EXTREMITIES: No lower extremity swelling or edema.    NEURO: Alert and oriented to person, place and time.  Cranial nerves grossly intact.  No focal motor deficit.  PSYCH: Anxious appearing   SKIN: No rashes      LAB:  All pertinent labs reviewed and interpreted.  Results for orders placed or performed during the hospital encounter of 04/30/24   UA with Microscopic reflex to Culture    Specimen: Urine, Midstream   Result Value Ref Range "    Color Urine Colorless Colorless, Straw, Light Yellow, Yellow    Appearance Urine Clear Clear    Glucose Urine Negative Negative mg/dL    Bilirubin Urine Negative Negative    Ketones Urine 20 (A) Negative mg/dL    Specific Gravity Urine 1.007 1.001 - 1.030    Blood Urine 0.03 mg/dL (A) Negative    pH Urine 6.5 5.0 - 7.0    Protein Albumin Urine Negative Negative mg/dL    Urobilinogen Urine <2.0 <2.0 mg/dL    Nitrite Urine Negative Negative    Leukocyte Esterase Urine Negative Negative    Mucus Urine Present (A) None Seen /LPF    RBC Urine <1 <=2 /HPF    WBC Urine <1 <=5 /HPF    Squamous Epithelials Urine <1 <=1 /HPF   Symptomatic Influenza A/B, RSV, & SARS-CoV2 PCR (COVID-19) Nasopharyngeal    Specimen: Nasopharyngeal; Swab   Result Value Ref Range    Influenza A PCR Negative Negative    Influenza B PCR Negative Negative    RSV PCR Negative Negative    SARS CoV2 PCR Negative Negative         I, Savanna Leonard, am serving as a scribe to document services personally performed by Dr. Deirdre Gary based on my observation and the provider's statements to me. I, Deirdre Gary MD attest that Savanna Leonard is acting in a scribe capacity, has observed my performance of the services and has documented them in accordance with my direction.       Deirdre Gary MD  04/30/24 [id number]

## 2024-04-30 NOTE — Clinical Note
Dash Victor was seen and treated in our emergency department on 4/30/2024.  She may return to work on 05/06/2024.       If you have any questions or concerns, please don't hesitate to call.      Deirdre Gary MD

## 2024-04-30 NOTE — ED TRIAGE NOTES
Brought in by ambulance with c/o flu like sx which started yesterday.  Last dose tylenol at 1800 yesterday     Triage Assessment (Adult)       Row Name 04/30/24 0137          Triage Assessment    Airway WDL WDL        Respiratory WDL    Respiratory WDL X;cough        Skin Circulation/Temperature WDL    Skin Circulation/Temperature WDL WDL        Cardiac WDL    Cardiac WDL X;chest pain        Peripheral/Neurovascular WDL    Peripheral Neurovascular WDL WDL        Cognitive/Neuro/Behavioral WDL    Cognitive/Neuro/Behavioral WDL WDL                     
Head is atraumatic. Head shape is symmetrical.